# Patient Record
Sex: MALE | Race: WHITE | NOT HISPANIC OR LATINO | Employment: UNEMPLOYED | URBAN - METROPOLITAN AREA
[De-identification: names, ages, dates, MRNs, and addresses within clinical notes are randomized per-mention and may not be internally consistent; named-entity substitution may affect disease eponyms.]

---

## 2020-01-28 PROBLEM — Z86.718 HISTORY OF DVT (DEEP VEIN THROMBOSIS): Status: ACTIVE | Noted: 2020-01-28

## 2020-01-29 ENCOUNTER — NURSE TRIAGE (OUTPATIENT)
Dept: ADMINISTRATIVE | Facility: CLINIC | Age: 60
End: 2020-01-29

## 2020-01-29 NOTE — TELEPHONE ENCOUNTER
Pt called stated he wanted to know if he could take a bath and he was already in the tub. Informed pt that discharge papers states to keep dressing intact for 48 hours and he may take a shower after that but not to soak nor swim. Pt stated he was already in the tub and he was not getting incision wet. Pt also asked if he could take 2 of the 7.5 mg Norcos. Informed pt that the instructions said Norco 7.5 mg 1 tab every 6 hour as needed. I asked pt if he had both 10s and 7.5s in the home because both were on his discharge papers. Pt stated no only the 7.5. Please call pt with additional care advice.     Reason for Disposition   Health Information question, no triage required and triager able to answer question    Protocols used: INFORMATION ONLY CALL-A-

## 2020-02-20 PROBLEM — D17.1 LIPOMA OF TORSO: Status: ACTIVE | Noted: 2020-02-20

## 2020-02-20 PROBLEM — L76.33 POSTOPERATIVE SEROMA OF SUBCUTANEOUS TISSUE AFTER DERMATOLOGIC PROCEDURE: Status: ACTIVE | Noted: 2020-02-20

## 2020-02-20 PROBLEM — Z86.018 STATUS POST EXCISION OF LIPOMA: Status: ACTIVE | Noted: 2020-02-20

## 2020-02-20 PROBLEM — Z98.890 STATUS POST EXCISION OF LIPOMA: Status: ACTIVE | Noted: 2020-02-20

## 2020-02-26 PROBLEM — L76.33 POSTOPERATIVE SEROMA OF SUBCUTANEOUS TISSUE AFTER DERMATOLOGIC PROCEDURE: Status: ACTIVE | Noted: 2020-02-26

## 2024-02-27 PROBLEM — G89.29 CHRONIC LEFT SHOULDER PAIN: Status: ACTIVE | Noted: 2024-02-27

## 2024-02-27 PROBLEM — M25.512 CHRONIC LEFT SHOULDER PAIN: Status: ACTIVE | Noted: 2024-02-27

## 2024-02-27 PROBLEM — S49.92XA INJURY OF LEFT SHOULDER: Status: ACTIVE | Noted: 2024-02-27

## 2024-02-27 PROBLEM — M19.012 OSTEOARTHRITIS OF LEFT SHOULDER: Status: ACTIVE | Noted: 2024-02-27
